# Patient Record
Sex: FEMALE | Race: WHITE | Employment: OTHER | URBAN - METROPOLITAN AREA
[De-identification: names, ages, dates, MRNs, and addresses within clinical notes are randomized per-mention and may not be internally consistent; named-entity substitution may affect disease eponyms.]

---

## 2023-10-03 ENCOUNTER — HOSPITAL ENCOUNTER (OUTPATIENT)
Dept: GENERAL RADIOLOGY | Age: 83
Discharge: HOME | End: 2023-10-05
Payer: MEDICARE

## 2023-10-03 DIAGNOSIS — R10.12 ABDOMINAL PAIN, LEFT UPPER QUADRANT: ICD-10-CM

## 2023-10-03 PROCEDURE — 74019 RADEX ABDOMEN 2 VIEWS: CPT

## 2023-10-13 ENCOUNTER — HOSPITAL ENCOUNTER (OUTPATIENT)
Dept: CT IMAGING | Age: 83
End: 2023-10-13
Payer: MEDICARE

## 2023-10-13 DIAGNOSIS — R10.12 LUQ ABDOMINAL PAIN: ICD-10-CM

## 2023-10-13 DIAGNOSIS — M54.50 ACUTE LOW BACK PAIN WITHOUT SCIATICA, UNSPECIFIED BACK PAIN LATERALITY: ICD-10-CM

## 2023-10-13 DIAGNOSIS — K59.00 CONSTIPATION, UNSPECIFIED CONSTIPATION TYPE: ICD-10-CM

## 2023-10-13 LAB
PERFORMED ON: NORMAL
POC CREATININE: 0.7 MG/DL (ref 0.6–1.2)
POC SAMPLE TYPE: NORMAL

## 2023-10-13 PROCEDURE — 6360000004 HC RX CONTRAST MEDICATION

## 2023-10-13 PROCEDURE — 74177 CT ABD & PELVIS W/CONTRAST: CPT

## 2023-10-13 RX ADMIN — IOPAMIDOL 50 ML: 612 INJECTION, SOLUTION INTRAVENOUS at 10:21

## 2024-02-28 ENCOUNTER — APPOINTMENT (OUTPATIENT)
Dept: GENERAL RADIOLOGY | Age: 84
DRG: 436 | End: 2024-02-28
Payer: MEDICARE

## 2024-02-28 ENCOUNTER — APPOINTMENT (OUTPATIENT)
Dept: CT IMAGING | Age: 84
DRG: 436 | End: 2024-02-28
Attending: EMERGENCY MEDICINE
Payer: MEDICARE

## 2024-02-28 ENCOUNTER — HOSPITAL ENCOUNTER (INPATIENT)
Age: 84
LOS: 1 days | Discharge: HOSPICE | DRG: 436 | End: 2024-02-29
Attending: EMERGENCY MEDICINE | Admitting: FAMILY MEDICINE
Payer: MEDICARE

## 2024-02-28 VITALS
TEMPERATURE: 97 F | RESPIRATION RATE: 17 BRPM | HEART RATE: 100 BPM | DIASTOLIC BLOOD PRESSURE: 61 MMHG | SYSTOLIC BLOOD PRESSURE: 92 MMHG | OXYGEN SATURATION: 95 %

## 2024-02-28 DIAGNOSIS — D72.829 LEUKOCYTOSIS, UNSPECIFIED TYPE: ICD-10-CM

## 2024-02-28 DIAGNOSIS — C78.7 MALIGNANT NEOPLASM METASTATIC TO LIVER (HCC): ICD-10-CM

## 2024-02-28 DIAGNOSIS — R53.1 GENERAL WEAKNESS: Primary | ICD-10-CM

## 2024-02-28 DIAGNOSIS — R74.8 ELEVATED LIVER ENZYMES: ICD-10-CM

## 2024-02-28 DIAGNOSIS — E86.0 DEHYDRATION: ICD-10-CM

## 2024-02-28 DIAGNOSIS — E87.1 HYPONATREMIA: ICD-10-CM

## 2024-02-28 DIAGNOSIS — N17.9 ACUTE RENAL FAILURE, UNSPECIFIED ACUTE RENAL FAILURE TYPE (HCC): ICD-10-CM

## 2024-02-28 LAB
ALBUMIN SERPL-MCNC: 2.7 G/DL (ref 3.5–4.6)
ALP SERPL-CCNC: 1151 U/L (ref 40–130)
ALT SERPL-CCNC: 89 U/L (ref 0–33)
AMMONIA PLAS-SCNC: 49 UMOL/L (ref 11–51)
ANION GAP SERPL CALCULATED.3IONS-SCNC: 17 MEQ/L (ref 9–15)
ANISOCYTOSIS BLD QL SMEAR: ABNORMAL
AST SERPL-CCNC: 101 U/L (ref 0–35)
BACTERIA URNS QL MICRO: NEGATIVE /HPF
BASOPHILS # BLD: 0 K/UL (ref 0–0.2)
BASOPHILS NFR BLD: 0.3 %
BILIRUB SERPL-MCNC: 7.9 MG/DL (ref 0.2–0.7)
BILIRUB UR QL STRIP: ABNORMAL
BUN SERPL-MCNC: 60 MG/DL (ref 8–23)
BURR CELLS: ABNORMAL
CALCIUM SERPL-MCNC: 8.1 MG/DL (ref 8.5–9.9)
CHLORIDE SERPL-SCNC: 88 MEQ/L (ref 95–107)
CLARITY UR: CLEAR
CO2 SERPL-SCNC: 19 MEQ/L (ref 20–31)
COLOR UR: ABNORMAL
CREAT SERPL-MCNC: 2.11 MG/DL (ref 0.5–0.9)
D DIMER PPP FEU-MCNC: 5.12 MG/L FEU (ref 0–0.5)
EOSINOPHIL # BLD: 0.5 K/UL (ref 0–0.7)
EOSINOPHIL NFR BLD: 2 %
EPI CELLS #/AREA URNS AUTO: NORMAL /HPF (ref 0–5)
ERYTHROCYTE [DISTWIDTH] IN BLOOD BY AUTOMATED COUNT: 17.8 % (ref 11.5–14.5)
FINE GRAN CASTS #/AREA URNS HPF: NORMAL /LPF (ref 0–5)
GLOBULIN SER CALC-MCNC: 3 G/DL (ref 2.3–3.5)
GLUCOSE SERPL-MCNC: 101 MG/DL (ref 70–99)
GLUCOSE UR STRIP-MCNC: NEGATIVE MG/DL
HCT VFR BLD AUTO: 41.9 % (ref 37–47)
HGB BLD-MCNC: 14.8 G/DL (ref 12–16)
HGB UR QL STRIP: NEGATIVE
HYALINE CASTS #/AREA URNS AUTO: NORMAL /HPF (ref 0–5)
INR PPP: 1.6
KETONES UR STRIP-MCNC: ABNORMAL MG/DL
LACTATE BLDV-SCNC: 2.3 MMOL/L (ref 0.5–2.2)
LEUKOCYTE ESTERASE UR QL STRIP: ABNORMAL
LIPASE SERPL-CCNC: 26 U/L (ref 12–95)
LYMPHOCYTES # BLD: 0.8 K/UL (ref 1–4.8)
LYMPHOCYTES NFR BLD: 2 %
MACROCYTES BLD QL SMEAR: ABNORMAL
MAGNESIUM SERPL-MCNC: 2.7 MG/DL (ref 1.7–2.4)
MCH RBC QN AUTO: 28.4 PG (ref 27–31.3)
MCHC RBC AUTO-ENTMCNC: 35.3 % (ref 33–37)
MCV RBC AUTO: 80.4 FL (ref 79.4–94.8)
MONOCYTES # BLD: 0.5 K/UL (ref 0.2–0.8)
MONOCYTES NFR BLD: 1.9 %
NEUTROPHILS # BLD: 23.8 K/UL (ref 1.4–6.5)
NEUTS SEG NFR BLD: 93 %
NITRITE UR QL STRIP: POSITIVE
PH UR STRIP: 5 [PH] (ref 5–9)
PLATELET # BLD AUTO: 313 K/UL (ref 130–400)
PLATELET BLD QL SMEAR: ADEQUATE
POIKILOCYTOSIS BLD QL SMEAR: ABNORMAL
POLYCHROMASIA BLD QL SMEAR: ABNORMAL
POTASSIUM SERPL-SCNC: 4 MEQ/L (ref 3.4–4.9)
PROT SERPL-MCNC: 5.7 G/DL (ref 6.3–8)
PROT UR STRIP-MCNC: ABNORMAL MG/DL
PROTHROMBIN TIME: 19.2 SEC (ref 12.3–14.9)
RBC # BLD AUTO: 5.21 M/UL (ref 4.2–5.4)
RBC #/AREA URNS AUTO: NORMAL /HPF (ref 0–5)
RBC #/AREA URNS HPF: NORMAL /HPF (ref 0–2)
SARS-COV-2 RDRP RESP QL NAA+PROBE: NOT DETECTED
SLIDE REVIEW: ABNORMAL
SMUDGE CELLS BLD QL SMEAR: 6.7
SODIUM SERPL-SCNC: 124 MEQ/L (ref 135–144)
SP GR UR STRIP: 1.02 (ref 1–1.03)
T4 FREE SERPL-MCNC: 1.39 NG/DL (ref 0.84–1.68)
TARGETS BLD QL SMEAR: ABNORMAL
TROPONIN, HIGH SENSITIVITY: 39 NG/L (ref 0–19)
TROPONIN, HIGH SENSITIVITY: 41 NG/L (ref 0–19)
TSH SERPL-MCNC: 3.82 UIU/ML (ref 0.44–3.86)
UROBILINOGEN UR STRIP-ACNC: 2 E.U./DL
VARIANT LYMPHS NFR BLD: 1 %
WBC # BLD AUTO: 25.6 K/UL (ref 4.8–10.8)
WBC #/AREA URNS AUTO: NORMAL /HPF (ref 0–5)

## 2024-02-28 PROCEDURE — 87040 BLOOD CULTURE FOR BACTERIA: CPT

## 2024-02-28 PROCEDURE — 83690 ASSAY OF LIPASE: CPT

## 2024-02-28 PROCEDURE — 80053 COMPREHEN METABOLIC PANEL: CPT

## 2024-02-28 PROCEDURE — 84439 ASSAY OF FREE THYROXINE: CPT

## 2024-02-28 PROCEDURE — 81001 URINALYSIS AUTO W/SCOPE: CPT

## 2024-02-28 PROCEDURE — 74176 CT ABD & PELVIS W/O CONTRAST: CPT

## 2024-02-28 PROCEDURE — 85025 COMPLETE CBC W/AUTO DIFF WBC: CPT

## 2024-02-28 PROCEDURE — 85379 FIBRIN DEGRADATION QUANT: CPT

## 2024-02-28 PROCEDURE — 96365 THER/PROPH/DIAG IV INF INIT: CPT

## 2024-02-28 PROCEDURE — 51701 INSERT BLADDER CATHETER: CPT

## 2024-02-28 PROCEDURE — 84484 ASSAY OF TROPONIN QUANT: CPT

## 2024-02-28 PROCEDURE — 2580000003 HC RX 258: Performed by: EMERGENCY MEDICINE

## 2024-02-28 PROCEDURE — 82140 ASSAY OF AMMONIA: CPT

## 2024-02-28 PROCEDURE — 87635 SARS-COV-2 COVID-19 AMP PRB: CPT

## 2024-02-28 PROCEDURE — 99285 EMERGENCY DEPT VISIT HI MDM: CPT

## 2024-02-28 PROCEDURE — 36415 COLL VENOUS BLD VENIPUNCTURE: CPT

## 2024-02-28 PROCEDURE — 6360000002 HC RX W HCPCS: Performed by: EMERGENCY MEDICINE

## 2024-02-28 PROCEDURE — 83735 ASSAY OF MAGNESIUM: CPT

## 2024-02-28 PROCEDURE — 93005 ELECTROCARDIOGRAM TRACING: CPT | Performed by: EMERGENCY MEDICINE

## 2024-02-28 PROCEDURE — 83605 ASSAY OF LACTIC ACID: CPT

## 2024-02-28 PROCEDURE — 71045 X-RAY EXAM CHEST 1 VIEW: CPT

## 2024-02-28 PROCEDURE — 85610 PROTHROMBIN TIME: CPT

## 2024-02-28 PROCEDURE — 84443 ASSAY THYROID STIM HORMONE: CPT

## 2024-02-28 RX ADMIN — PIPERACILLIN AND TAZOBACTAM 3375 MG: 3; .375 INJECTION, POWDER, FOR SOLUTION INTRAVENOUS at 13:58

## 2024-02-28 ASSESSMENT — LIFESTYLE VARIABLES
HOW OFTEN DO YOU HAVE A DRINK CONTAINING ALCOHOL: NEVER
HOW MANY STANDARD DRINKS CONTAINING ALCOHOL DO YOU HAVE ON A TYPICAL DAY: PATIENT DOES NOT DRINK

## 2024-02-28 NOTE — CARE COORDINATION
Dr Sanchez spoke with the patient's family regarding her condition and that hospice is recommended. This nurse provided the family with a list of hospice providers for freedom of choice and they are seated in the family room. They will inform this nurse about their choice and any needs. At this time they are interested in the patient receiving hospice care at home. They denied any needs at this time.

## 2024-02-28 NOTE — CARE COORDINATION
This nurse spoke to the patient and her family and they would like [facility]. This nurse contacted the admission nurse Flor who spoke with the patient and her family. A hospice nurse will come at 2400 which is the soonest that a nurse is available. The patient's family did speak with her. Their stated plan is for the patient to go to the hospice inpatient unit. The patient was repositioned by her family member for comfort and warm blankets were given to her. She is taking sips of water.

## 2024-02-28 NOTE — ED NOTES
Moved pt from stretcher to admit bed, repositioned on right side, placed warm blankets on pt, call light within reach family at bedside, bed low locked

## 2024-02-28 NOTE — ED PROVIDER NOTES
[facility] ED  eMERGENCY dEPARTMENT eNCOUnter      Pt Name: Lisa Schilling  MRN: [id number]  Birthdate 1940  Date of evaluation: 2/28/2024  Provider: Collin Sanchez MD    CHIEF COMPLAINT       Chief Complaint   Patient presents with    Fatigue     Pt also had abnormal labs   Increased fatigue         HISTORY OF PRESENT ILLNESS   (Location/Symptom, Timing/Onset,Context/Setting, Quality, Duration, Modifying Factors, Severity)  Note limiting factors.   Lisa Schilling is a 84 y.o. female who presents to the emergency department 1 month after a cholecystectomy that was done including clinic outpatient.  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  Patient comes in today generalized weakness not eating or drinking really almost unable to ambulate.  Patient has a multiple abnormal blood work findings that were done as an outpatient.  The primary care physician sent the patient and the family to the emergency department for further evaluation.  Patient does have generalized abdominal pain.  Worse with palpation better with rest dull and achy in nature has been going on for at least 3 to 4 weeks.  Getting worse in the last several days    HPI    NursingNotes were reviewed.    REVIEW OF SYSTEMS    (2-9 systems for level 4, 10 or more for level 5)     Review of Systems   All other systems reviewed and are negative.      Except as noted above the remainder of the review of systems was reviewed and negative.       PAST MEDICAL HISTORY   No past medical history on file.      SURGICALHISTORY     No past surgical history on file.      CURRENT MEDICATIONS       Previous Medications    No medications on file       ALLERGIES     Patient has no known allergies.    FAMILY HISTORY     No family history on file.       SOCIAL HISTORY       Social History     Socioeconomic History    Marital status:        SCREENINGS    Javier Coma Scale  Eye Opening:  02/28/2024  12:46, by JOSÉ MIGUEL   LACTIC ACID - Abnormal; Notable for the following components:    Lactic Acid 2.3 (*)     All other components within normal limits   MAGNESIUM - Abnormal; Notable for the following components:    Magnesium 2.7 (*)     All other components within normal limits   PROTIME-INR - Abnormal; Notable for the following components:    Protime 19.2 (*)     All other components within normal limits   TROPONIN - Abnormal; Notable for the following components:    Troponin, High Sensitivity 41 (*)     All other components within normal limits   TROPONIN - Abnormal; Notable for the following components:    Troponin, High Sensitivity 39 (*)     All other components within normal limits   URINALYSIS - Abnormal; Notable for the following components:    Color, UA DARK YELLOW (*)     Bilirubin Urine MODERATE (*)     Ketones, Urine TRACE (*)     Protein, UA TRACE (*)     Urobilinogen, Urine 2.0 (*)     Nitrite, Urine POSITIVE (*)     Leukocyte Esterase, Urine SMALL (*)     All other components within normal limits   COVID-19, RAPID   CULTURE, BLOOD 1   CULTURE, BLOOD 2   LIPASE   T4, FREE   TSH   AMMONIA   MICROSCOPIC URINALYSIS       All other labs were within normal range or not returned as of this dictation.    EMERGENCY DEPARTMENT COURSE and DIFFERENTIAL DIAGNOSIS/MDM:   Vitals:    Vitals:    02/28/24 1415 02/28/24 1430 02/28/24 1445 02/28/24 1500   BP:       Pulse: 92 91 96 93   Resp: 15 16 16 15   Temp:       TempSrc:       SpO2: 95% 94% 96% 97%           MDM     Amount and/or Complexity of Data Reviewed  Clinical lab tests: reviewed  Tests in the radiology section of CPT®: reviewed  Tests in the medicine section of CPT®: reviewed  Obtain history from someone other than the patient: yes  Discuss the patient with other providers: yes  Independent visualization of images, tracings, or specimens: yes      I discussed vital signs and other pertinent information concerning this patient with the paramedics

## 2024-02-28 NOTE — PROGRESS NOTES
Spiritual Care Services     Summary of Visit:     responded to call to Er, Pt family at bedside, Prayed for the pt per family request,       Spiritual Assessment/Intervention/Outcomes:    Encounter Summary  Encounter Overview/Reason : Spiritual/Emotional Needs, Grief, Loss, and Adjustments  Service Provided For:: Patient and family together  Referral/Consult From:: Family, Nurse, Physician  Support System: Spouse, Children, Family members  Complexity of Encounter: Low  Begin Time: 1420  End Time : 1431  Total Time Calculated: 11 min        Spiritual/Emotional needs  Type: Spiritual Support, Difficult news received     Grief, Loss, and Adjustments  Type: Anticipatory Grief, Hospice                   Care Plan:        Spiritual Care Services   Electronically signed by Davie Hi on 2/28/24 at 4:37 PM EST     To reach a  for emotional and spiritual support, place an EPIC consult request.   If a  is needed immediately, dial “0” and ask to page the on-call .

## 2024-02-28 NOTE — ED TRIAGE NOTES
Next appt 5/17/22  Last appt 9/22/21    Refill request for  Disp Refills Start End     salsalate (DISALCID) 500 MG tablet 360 tablet 1 5/18/2021     Sig - Route: Take 2 tablets by mouth 2 times daily. - Oral      Refill unable to be completed per standing protocol due to; NON PROTOCOL  Orders pended, and routed to provider for approval.  Please route any notes back to your nursing pool via patient call, NOT Rx Auth.     Thank you, Refill Center Staff Pt arrived due to abnormal labs and increased weakness   Pt arrived from home with   Pt has gallbladder surgery 1/30/2024 this year  Pt is alert and oriented times 4

## 2024-02-28 NOTE — ED NOTES
Pt lying in bed at this time  Family is at bedside at this time  Pt waiting for transfer to [facility]

## 2024-02-29 PROBLEM — K86.89 PANCREATIC MASS: Status: ACTIVE | Noted: 2024-02-29

## 2024-02-29 PROBLEM — D49.0 LIVER NEOPLASM: Status: ACTIVE | Noted: 2024-02-29

## 2024-02-29 LAB
BACTERIA BLD CULT ORG #2: NORMAL
BACTERIA BLD CULT: NORMAL
EKG ATRIAL RATE: 96 BPM
EKG P AXIS: 60 DEGREES
EKG P-R INTERVAL: 188 MS
EKG Q-T INTERVAL: 352 MS
EKG QRS DURATION: 78 MS
EKG QTC CALCULATION (BAZETT): 444 MS
EKG R AXIS: -50 DEGREES
EKG T AXIS: 59 DEGREES
EKG VENTRICULAR RATE: 96 BPM

## 2024-02-29 PROCEDURE — 1200000000 HC SEMI PRIVATE

## 2024-02-29 RX ORDER — ONDANSETRON 2 MG/ML
4 INJECTION INTRAMUSCULAR; INTRAVENOUS EVERY 6 HOURS PRN
Status: DISCONTINUED | OUTPATIENT
Start: 2024-02-29 | End: 2024-02-29 | Stop reason: HOSPADM

## 2024-02-29 RX ORDER — SODIUM CHLORIDE 0.9 % (FLUSH) 0.9 %
5-40 SYRINGE (ML) INJECTION PRN
Status: DISCONTINUED | OUTPATIENT
Start: 2024-02-29 | End: 2024-02-29 | Stop reason: HOSPADM

## 2024-02-29 RX ORDER — SODIUM CHLORIDE 0.9 % (FLUSH) 0.9 %
5-40 SYRINGE (ML) INJECTION EVERY 12 HOURS SCHEDULED
Status: DISCONTINUED | OUTPATIENT
Start: 2024-02-29 | End: 2024-02-29 | Stop reason: HOSPADM

## 2024-02-29 RX ORDER — ONDANSETRON 4 MG/1
4 TABLET, ORALLY DISINTEGRATING ORAL EVERY 8 HOURS PRN
Status: DISCONTINUED | OUTPATIENT
Start: 2024-02-29 | End: 2024-02-29

## 2024-02-29 RX ORDER — SODIUM CHLORIDE 9 MG/ML
INJECTION, SOLUTION INTRAVENOUS PRN
Status: DISCONTINUED | OUTPATIENT
Start: 2024-02-29 | End: 2024-02-29 | Stop reason: HOSPADM

## 2024-02-29 RX ORDER — ACETAMINOPHEN 325 MG/1
650 TABLET ORAL EVERY 4 HOURS PRN
Status: DISCONTINUED | OUTPATIENT
Start: 2024-02-29 | End: 2024-02-29 | Stop reason: HOSPADM

## 2024-03-04 LAB
BACTERIA BLD CULT ORG #2: NORMAL
BACTERIA BLD CULT: NORMAL